# Patient Record
Sex: MALE | Race: WHITE
[De-identification: names, ages, dates, MRNs, and addresses within clinical notes are randomized per-mention and may not be internally consistent; named-entity substitution may affect disease eponyms.]

---

## 2022-08-11 PROBLEM — Z00.00 ENCOUNTER FOR PREVENTIVE HEALTH EXAMINATION: Status: ACTIVE | Noted: 2022-08-11

## 2022-08-29 ENCOUNTER — APPOINTMENT (OUTPATIENT)
Dept: ORTHOPEDIC SURGERY | Facility: CLINIC | Age: 72
End: 2022-08-29

## 2022-08-29 VITALS — WEIGHT: 160 LBS | BODY MASS INDEX: 23.7 KG/M2 | HEIGHT: 69 IN

## 2022-08-29 DIAGNOSIS — S63.412A: ICD-10-CM

## 2022-08-29 DIAGNOSIS — M79.644 PAIN IN RIGHT FINGER(S): ICD-10-CM

## 2022-08-29 PROCEDURE — 29280 STRAPPING OF HAND OR FINGER: CPT

## 2022-08-29 PROCEDURE — 99203 OFFICE O/P NEW LOW 30 MIN: CPT | Mod: 25

## 2022-08-29 PROCEDURE — 73140 X-RAY EXAM OF FINGER(S): CPT | Mod: RT

## 2022-08-29 NOTE — DISCUSSION/SUMMARY
[de-identified] : Discussed healing and possible difficulty to regain stability.  Straps recommended.

## 2022-08-29 NOTE — HISTORY OF PRESENT ILLNESS
[5] : 5 [3] : 3 [Localized] : localized [Nothing helps with pain getting better] : Nothing helps with pain getting better [de-identified] : 72 year old had snap in RIGHT middle-finger when opening a jar.  This started about 3 weeks ago.  Pain did not last, but has noted angular deformity of finger most pronounced with flexion. [] : no [FreeTextEntry1] : right hand middle finger  [FreeTextEntry3] : 3 weeks ago  [FreeTextEntry5] : pt states he was trying to open a jar  [FreeTextEntry6] : weakness, swelling  [de-identified] : activity  [de-identified] : 3 weeks ago  [de-identified] : [facility]

## 2022-08-29 NOTE — PHYSICAL EXAM
[MCP Joint] : MCP joint [3rd] : 3rd [A1-Pulley] : A1-pulley [Right] : right fingers [] : no erythema [FreeTextEntry3] : middle finger sits in ulnar deviation  [de-identified] : laxity of RCL  [FreeTextEntry9] : moderate IP degenerative changes

## 2022-10-03 ENCOUNTER — APPOINTMENT (OUTPATIENT)
Dept: ORTHOPEDIC SURGERY | Facility: CLINIC | Age: 72
End: 2022-10-03

## 2023-08-17 ENCOUNTER — OFFICE (OUTPATIENT)
Facility: LOCATION | Age: 73
Setting detail: OPHTHALMOLOGY
End: 2023-08-17
Payer: MEDICARE

## 2023-08-17 ENCOUNTER — RX ONLY (RX ONLY)
Age: 73
End: 2023-08-17

## 2023-08-17 DIAGNOSIS — H01.005: ICD-10-CM

## 2023-08-17 DIAGNOSIS — H01.002: ICD-10-CM

## 2023-08-17 DIAGNOSIS — H16.223: ICD-10-CM

## 2023-08-17 DIAGNOSIS — H01.004: ICD-10-CM

## 2023-08-17 DIAGNOSIS — D31.32: ICD-10-CM

## 2023-08-17 DIAGNOSIS — H35.373: ICD-10-CM

## 2023-08-17 DIAGNOSIS — H01.001: ICD-10-CM

## 2023-08-17 DIAGNOSIS — Z13.5: ICD-10-CM

## 2023-08-17 DIAGNOSIS — H52.13: ICD-10-CM

## 2023-08-17 PROBLEM — Z96.1 PSEUDOPHAKIA ; BOTH EYES: Status: ACTIVE | Noted: 2023-08-17

## 2023-08-17 PROBLEM — H43.813 VITREOUS DETACHMENT; BOTH EYES: Status: ACTIVE | Noted: 2023-08-17

## 2023-08-17 PROBLEM — H11.151 PINGUECULA; RIGHT EYE: Status: ACTIVE | Noted: 2023-08-17

## 2023-08-17 PROBLEM — H35.033 HYPERTENSIVE RETINOPATHY; BOTH EYES: Status: ACTIVE | Noted: 2023-08-17

## 2023-08-17 PROCEDURE — 92014 COMPRE OPH EXAM EST PT 1/>: CPT | Performed by: OPTOMETRIST

## 2023-08-17 PROCEDURE — 92015 DETERMINE REFRACTIVE STATE: CPT | Performed by: OPTOMETRIST

## 2023-08-17 PROCEDURE — 92250 FUNDUS PHOTOGRAPHY W/I&R: CPT | Performed by: OPTOMETRIST

## 2023-08-17 ASSESSMENT — SUPERFICIAL PUNCTATE KERATITIS (SPK)
OS_SPK: T
OD_SPK: T

## 2023-08-17 ASSESSMENT — LID EXAM ASSESSMENTS
OD_BLEPHARITIS: T
OS_BLEPHARITIS: T

## 2023-08-17 ASSESSMENT — CONFRONTATIONAL VISUAL FIELD TEST (CVF)
OS_FINDINGS: FULL
OD_FINDINGS: FULL

## 2023-08-17 ASSESSMENT — TONOMETRY
OD_IOP_MMHG: 15
OS_IOP_MMHG: 15

## 2023-08-28 PROBLEM — D31.30 CHOROIDAL NEVUS ; LEFT EYE: Status: ACTIVE | Noted: 2023-08-17

## 2023-08-28 PROBLEM — Z13.5 ENCOUNTER FOR SCREENING: Status: ACTIVE | Noted: 2023-08-17

## 2023-08-28 PROBLEM — D31.31 CHOROIDAL NEVUS ; LEFT EYE: Status: ACTIVE | Noted: 2023-08-17

## 2023-08-28 PROBLEM — D31.32 CHOROIDAL NEVUS ; LEFT EYE: Status: ACTIVE | Noted: 2023-08-17

## 2023-08-28 ASSESSMENT — REFRACTION_AUTOREFRACTION
OD_SPHERE: -0.75
OS_CYLINDER: +0.50
OS_SPHERE: -0.50
OS_AXIS: 12
OD_AXIS: 8
OD_CYLINDER: +0.75

## 2023-08-28 ASSESSMENT — SPHEQUIV_DERIVED
OS_SPHEQUIV: -0.25
OD_SPHEQUIV: -0.375
OS_SPHEQUIV: -0.25
OD_SPHEQUIV: -0.375

## 2023-08-28 ASSESSMENT — REFRACTION_MANIFEST
OS_VA1: 20/20
OD_AXIS: 180
OS_AXIS: 165
OS_ADD: +2.50
OD_CYLINDER: +0.75
OD_VA1: 20/25+2
OS_CYLINDER: +0.50
OD_VA2: 20/20(J1+)
OD_ADD: +2.50
OD_SPHERE: -0.75
OS_SPHERE: -0.50
OS_VA2: 20/20(J1+)

## 2023-08-28 ASSESSMENT — VISUAL ACUITY
OS_BCVA: 20/30-1
OD_BCVA: 20/25-1

## 2025-08-19 ENCOUNTER — OFFICE (OUTPATIENT)
Facility: LOCATION | Age: 75
Setting detail: OPHTHALMOLOGY
End: 2025-08-19
Payer: MEDICARE

## 2025-08-19 DIAGNOSIS — D31.32: ICD-10-CM

## 2025-08-19 DIAGNOSIS — H35.033: ICD-10-CM

## 2025-08-19 DIAGNOSIS — H16.223: ICD-10-CM

## 2025-08-19 DIAGNOSIS — H43.813: ICD-10-CM

## 2025-08-19 DIAGNOSIS — H11.151: ICD-10-CM

## 2025-08-19 PROCEDURE — 92014 COMPRE OPH EXAM EST PT 1/>: CPT | Performed by: OPTOMETRIST

## 2025-08-19 PROCEDURE — 92250 FUNDUS PHOTOGRAPHY W/I&R: CPT | Performed by: OPTOMETRIST

## 2025-08-19 ASSESSMENT — CONFRONTATIONAL VISUAL FIELD TEST (CVF)
OD_FINDINGS: FULL
OS_FINDINGS: FULL

## 2025-08-19 ASSESSMENT — REFRACTION_MANIFEST
OS_ADD: +2.50
OD_VA2: 20/20(J1+)
OS_SPHERE: -0.50
OS_AXIS: 165
OS_VA1: 20/20
OD_ADD: +2.50
OD_AXIS: 180
OS_CYLINDER: +0.50
OS_VA2: 20/20(J1+)
OD_VA1: 20/25+2
OD_CYLINDER: +0.75
OD_SPHERE: -0.75

## 2025-08-19 ASSESSMENT — REFRACTION_AUTOREFRACTION
OS_CYLINDER: +0.50
OS_SPHERE: -0.50
OD_CYLINDER: +1.00
OS_AXIS: 176
OD_SPHERE: -1.00
OD_AXIS: 012

## 2025-08-19 ASSESSMENT — LID EXAM ASSESSMENTS
OS_BLEPHARITIS: T
OD_BLEPHARITIS: T

## 2025-08-19 ASSESSMENT — TONOMETRY
OS_IOP_MMHG: 14
OD_IOP_MMHG: 13

## 2025-08-19 ASSESSMENT — VISUAL ACUITY
OD_BCVA: 20/30+2
OS_BCVA: 20/30-1

## 2025-08-19 ASSESSMENT — SUPERFICIAL PUNCTATE KERATITIS (SPK)
OD_SPK: T
OS_SPK: T